# Patient Record
Sex: FEMALE | Race: BLACK OR AFRICAN AMERICAN | Employment: FULL TIME | URBAN - METROPOLITAN AREA
[De-identification: names, ages, dates, MRNs, and addresses within clinical notes are randomized per-mention and may not be internally consistent; named-entity substitution may affect disease eponyms.]

---

## 2019-03-04 ENCOUNTER — OFFICE VISIT (OUTPATIENT)
Dept: FAMILY MEDICINE CLINIC | Age: 44
End: 2019-03-04

## 2019-03-04 VITALS
TEMPERATURE: 97.5 F | BODY MASS INDEX: 42.36 KG/M2 | WEIGHT: 230.2 LBS | RESPIRATION RATE: 16 BRPM | HEART RATE: 68 BPM | DIASTOLIC BLOOD PRESSURE: 76 MMHG | SYSTOLIC BLOOD PRESSURE: 130 MMHG | OXYGEN SATURATION: 98 % | HEIGHT: 62 IN

## 2019-03-04 DIAGNOSIS — M67.432 GANGLION CYST OF WRIST, LEFT: ICD-10-CM

## 2019-03-04 DIAGNOSIS — F32.A DEPRESSION, UNSPECIFIED DEPRESSION TYPE: ICD-10-CM

## 2019-03-04 DIAGNOSIS — I10 ESSENTIAL HYPERTENSION: Primary | ICD-10-CM

## 2019-03-04 DIAGNOSIS — Z80.3 FAMILY HISTORY OF BREAST CANCER IN MOTHER: ICD-10-CM

## 2019-03-04 DIAGNOSIS — Z13.220 SCREENING, LIPID: ICD-10-CM

## 2019-03-04 DIAGNOSIS — D64.9 ANEMIA, UNSPECIFIED TYPE: ICD-10-CM

## 2019-03-04 DIAGNOSIS — N63.0 LARGE MASS OF BREAST: ICD-10-CM

## 2019-03-04 DIAGNOSIS — E55.9 VITAMIN D DEFICIENCY: ICD-10-CM

## 2019-03-04 DIAGNOSIS — Z13.29 SCREENING FOR THYROID DISORDER: ICD-10-CM

## 2019-03-04 PROBLEM — E66.01 OBESITY, MORBID (HCC): Status: ACTIVE | Noted: 2019-03-04

## 2019-03-04 RX ORDER — LISINOPRIL AND HYDROCHLOROTHIAZIDE 20; 25 MG/1; MG/1
1 TABLET ORAL DAILY
Qty: 90 TAB | Refills: 0 | Status: SHIPPED | OUTPATIENT
Start: 2019-03-04 | End: 2019-06-14 | Stop reason: SDUPTHER

## 2019-03-04 NOTE — PROGRESS NOTES
ESTABLISH CARE VISIT    SUBJECTIVE:     Chief Complaint   Patient presents with    Establish [facility]    Hypertension       HPI: 41 y.o. female with PMHx significant for hypertension, hidradenitis is here for the above chief complaint(s). Hypertension  Patient takes Lisinopril/HCTZ 20-25mg. She does states that she takes it at night. She takes it a night because she is worried about peeing a lot during the day, but she does get up to urinate twice at night. Large Breast  Patient has history of large breasts. She states that she wears a size \"G\" in bras. She has frequent back pain, her shoulders ache from bra straps, obvious indentions in her shoulders from bra. Her mother also had breast Ca at 46. Last mammogram 2018    Ganglion Cyst  Patient states that she has cyst on her left hand/wrist for the last wrist. Patient states that she has pain when she moves, tender to touch, hits it on things, getting larger in size. Patient had U/S of the wrist, cyst was conssitent with ganglion. She would like referral for removal.      Depression  Patient states that she has \"fits. \" She states that her coworkers have told her that she gets depressed. She does feel low at times, but doesn't know if this depression or just feeling low. Patient does have some loneliness at times. Patient denies SI, HI, AH, VH. Anemia  Patient has history of iron deficiency anemia, 2 to heavy periods. Will monitor. Health Maintenance:    Eye -  no complaints, last eye exam: 1/2019  Oral Health -  no complaints, last exam: needs to make apt. U/A -  no UTI sxs. Cardiac- denies history, denies chest pain  Mammo- 1/2019, Dr. Malcolm Michelle refers her for this  GYN- Dr. Malcolm Michelle- 1/2019  DEXA    Colonoscopy - no colon cancer in the family, screen at 48     Review of Systems   Constitutional: Negative for chills, diaphoresis, fever, malaise/fatigue and weight loss.    HENT: Negative for congestion, ear pain, hearing loss, nosebleeds, sinus pain, sore throat and tinnitus. Eyes: Negative for blurred vision, double vision, pain and discharge. Respiratory: Negative for cough, hemoptysis, shortness of breath and wheezing. Cardiovascular: Negative for chest pain, palpitations, orthopnea, claudication, leg swelling and PND. Gastrointestinal: Negative for abdominal pain, blood in stool, constipation, diarrhea, heartburn, melena, nausea and vomiting. Genitourinary: Negative for dysuria, flank pain, frequency, hematuria and urgency. Musculoskeletal: Negative for back pain, joint pain, myalgias and neck pain. Skin: Negative for itching and rash. Neurological: Negative for dizziness, speech change, focal weakness, seizures, weakness and headaches. Endo/Heme/Allergies: Negative for polydipsia. Does not bruise/bleed easily. Psychiatric/Behavioral: Positive for depression. Negative for memory loss, substance abuse and suicidal ideas. The patient is not nervous/anxious and does not have insomnia. @[facility]@  Current Outpatient Medications   Medication Sig    lisinopril (PRINIVIL, ZESTRIL) 20 mg tablet Take 20 mg by mouth daily. Indications: HYPERTENSION    methocarbamol (ROBAXIN) 750 mg tablet Take 1 Tab by mouth four (4) times daily. No current facility-administered medications for this visit.       Health Maintenance   Topic Date Due    DTaP/Tdap/Td series (1 - Tdap) 06/13/1998    PAP AKA CERVICAL CYTOLOGY  06/13/1998    Influenza Age 5 to Adult  08/01/2018       Medications and Allergies: Reviewed and confirmed in the chart    Past Medical Hx: Reviewed and confirmed in the chart  Past Medical History:   Diagnosis Date    Anemia     Gallbladder disease     GERD (gastroesophageal reflux disease)     Hypertension        Patient Active Problem List   Diagnosis Code    Ob[facility], morbid ([facility] E66.01       Family Hx, Surgical Hx, Social Hx: Reviewed and updated in EMR    OBJECTIVE:  Vitals:    03/04/19 0844   BP: 130/76   Pulse: 68   Resp: 16   Temp: 97.5 °F (36.4 °C)   TempSrc: Oral   SpO2: 98%   Weight: 230 lb 3.2 oz (104.4 kg)   Height: 5' 2\" (1.575 m)       BP Readings from Last 3 Encounters:   03/04/19 130/76   07/19/18 137/74   07/27/16 131/73     Wt Readings from Last 3 Encounters:   03/04/19 230 lb 3.2 oz (104.4 kg)   07/27/16 201 lb 4.5 oz (91.3 kg)       Physical Exam   Constitutional: She is oriented to person, place, and time and well-developed, well-nourished, and in no distress. No distress. Neck: Normal range of motion. Neck supple. No thyromegaly present. Cardiovascular: Normal rate, regular rhythm, normal heart sounds and intact distal pulses. Exam reveals no gallop and no friction rub. No murmur heard. Pulmonary/Chest: Effort normal and breath sounds normal. No respiratory distress. She has no wheezes. She has no rales. She exhibits no tenderness. Lymphadenopathy:     She has no cervical adenopathy. Neurological: She is alert and oriented to person, place, and time. Skin: Skin is warm and dry. She is not diaphoretic. Psychiatric: Mood, memory, affect and judgment normal.   Vitals reviewed. Lab Results   Component Value Date/Time    WBC 8.6 10/02/2017 09:40 AM    HGB 11.5 (L) 10/02/2017 09:40 AM    HCT 36.9 (L) 10/02/2017 09:40 AM    PLATELET 076 98/95/9463 09:40 AM     Lab Results   Component Value Date/Time    Sodium 139 08/31/2017 08:45 AM    Potassium 3.7 08/31/2017 08:45 AM    Chloride 103 08/31/2017 08:45 AM    CO2 29 08/31/2017 08:45 AM    Glucose 92 08/31/2017 08:45 AM    BUN 18 08/31/2017 08:45 AM    Creatinine 1.0 08/31/2017 08:45 AM     Lab Results   Component Value Date/Time    Cholesterol, total 173 08/31/2017 08:45 AM    HDL Cholesterol 44 08/31/2017 08:45 AM    LDL, calculated 111 08/31/2017 08:45 AM    Triglyceride 88 08/31/2017 08:45 AM     No results found for: GPT    Nursing Notes Reviewed    ASSESSMENT AND PLAN  Diagnoses and all orders for this visit:    1.  Essential hypertension  Advised patient to continue with BP medications as prescribed. Advised her to take medication in the morning. Patient is going to switch to see if this helps with night time urination.   -     lisinopril-hydroCHLOROthiazide (PRINZIDE, ZESTORETIC) 20-25 mg per tablet; Take 1 Tab by mouth daily.  -     LIPID PANEL; Future  -     METABOLIC PANEL, COMPREHENSIVE; Future  Monitor blood pressure at home. Report to clinic any systolic blood pressure >247 and/or diastolic blood pressure >887.     2. Large mass of breast  -     REFERRAL TO BREAST SURGERY    3. Family history of breast cancer in mother  -     REFERRAL TO BREAST SURGERY    4. Ganglion cyst of wrist, left  -     REFERRAL TO GENERAL SURGERY    5. Depression, unspecified depression type  -     REFERRAL TO PSYCHIATRY    6. Anemia, unspecified type  -     CBC WITH AUTOMATED DIFF; Future  -     FERRITIN; Future  -     IRON PROFILE; Future  -     VITAMIN B12; Future  -     FOLATE; Future    7. Screening for thyroid disorder  -     TSH 3RD GENERATION; Future    8. Vitamin D deficiency  -     VITAMIN D, 1, 25 DIHYDROXY; Future    9. Screening, lipid  -     LIPID PANEL; Future      AVS printed and provided to patient    Assessment and plan above discussed with patient, patient voiced understanding and agreement with plan. More than 50% of this 30 minute visit was spent face to face counseling the patient about the etiology and treatment options for the above health conditions outlined in assessment and plan     Candice PATTERSON  [address] [place] [address], [address] [place] [address] [phone]  JVP -   [phone]

## 2019-03-04 NOTE — PROGRESS NOTES
Chief Complaint   Patient presents with    Establish Care    Hypertension     1. Have you been to the ER, urgent care clinic since your last visit? Hospitalized since your last visit? No    2. Have you seen or consulted any other health care providers outside of the [address] [place] [address] since your last visit? Include any pap smears or colon screening.  No

## 2019-03-05 DIAGNOSIS — Z13.29 SCREENING FOR THYROID DISORDER: ICD-10-CM

## 2019-03-05 DIAGNOSIS — I10 ESSENTIAL HYPERTENSION: ICD-10-CM

## 2019-03-05 DIAGNOSIS — Z13.220 SCREENING, LIPID: ICD-10-CM

## 2019-03-05 DIAGNOSIS — D64.9 ANEMIA, UNSPECIFIED TYPE: ICD-10-CM

## 2019-03-08 ENCOUNTER — LAB ONLY (OUTPATIENT)
Dept: FAMILY MEDICINE CLINIC | Age: 44
End: 2019-03-08

## 2019-03-08 DIAGNOSIS — Z01.89 ROUTINE LAB DRAW: Primary | ICD-10-CM

## 2019-03-11 ENCOUNTER — CLINICAL SUPPORT (OUTPATIENT)
Dept: FAMILY MEDICINE CLINIC | Age: 44
End: 2019-03-11

## 2019-03-11 DIAGNOSIS — I10 ESSENTIAL HYPERTENSION: Primary | ICD-10-CM

## 2019-03-11 DIAGNOSIS — D64.9 ANEMIA, UNSPECIFIED TYPE: ICD-10-CM

## 2019-03-11 NOTE — PROGRESS NOTES
Samantha Turcios is a 41 y.o. female here this morning for labs only. She tolerated well and left showing no s/s of distress.

## 2019-03-12 LAB
A-G RATIO,AGRAT: 1.3 RATIO (ref 1.1–2.6)
ABSOLUTE LYMPHOCYTE COUNT, 10803: 2.5 K/UL (ref 1–4.8)
ALBUMIN SERPL-MCNC: 3.7 G/DL (ref 3.5–5)
ALP SERPL-CCNC: 90 U/L (ref 25–115)
ALT SERPL-CCNC: 7 U/L (ref 5–40)
ANION GAP SERPL CALC-SCNC: 17 MMOL/L
AST SERPL W P-5'-P-CCNC: 7 U/L (ref 10–37)
BASOPHILS # BLD: 0 K/UL (ref 0–0.2)
BASOPHILS NFR BLD: 0 % (ref 0–2)
BILIRUB SERPL-MCNC: 0.3 MG/DL (ref 0.2–1.2)
BUN SERPL-MCNC: 12 MG/DL (ref 6–22)
CALCIUM SERPL-MCNC: 9.1 MG/DL (ref 8.4–10.5)
CHLORIDE SERPL-SCNC: 103 MMOL/L (ref 98–110)
CHOLEST SERPL-MCNC: 156 MG/DL (ref 110–200)
CO2 SERPL-SCNC: 25 MMOL/L (ref 20–32)
CREAT SERPL-MCNC: 0.9 MG/DL (ref 0.5–1.2)
EOSINOPHIL # BLD: 0.1 K/UL (ref 0–0.5)
EOSINOPHIL NFR BLD: 1 % (ref 0–6)
ERYTHROCYTE [DISTWIDTH] IN BLOOD BY AUTOMATED COUNT: 18 % (ref 10–15.5)
FE % SATURATION,PSAT: 19 % (ref 20–50)
FERRITIN SERPL-MCNC: 39 NG/ML (ref 10–291)
FOLATE,FOL: 7.96 NG/ML
GFRAA, 66117: >60
GFRNA, 66118: >60
GLOBULIN,GLOB: 2.9 G/DL (ref 2–4)
GLUCOSE SERPL-MCNC: 104 MG/DL (ref 70–99)
GRANULOCYTES,GRANS: 63 % (ref 40–75)
HCT VFR BLD AUTO: 38.4 % (ref 35.1–46.5)
HDLC SERPL-MCNC: 3.8 MG/DL (ref 0–5)
HDLC SERPL-MCNC: 41 MG/DL (ref 40–59)
HGB BLD-MCNC: 11.9 G/DL (ref 11.7–15.5)
IRON,IRN: 50 MCG/DL (ref 30–160)
LDLC SERPL CALC-MCNC: 96 MG/DL (ref 50–99)
LYMPHOCYTES, LYMLT: 33 % (ref 20–45)
MCH RBC QN AUTO: 24 PG (ref 26–34)
MCHC RBC AUTO-ENTMCNC: 31 G/DL (ref 31–36)
MCV RBC AUTO: 79 FL (ref 80–95)
MONOCYTES # BLD: 0.3 K/UL (ref 0.1–1)
MONOCYTES NFR BLD: 4 % (ref 3–12)
NEUTROPHILS # BLD AUTO: 4.9 K/UL (ref 1.8–7.7)
PLATELET # BLD AUTO: 312 K/UL (ref 140–440)
PMV BLD AUTO: 10.6 FL (ref 9–13)
POTASSIUM SERPL-SCNC: 4.1 MMOL/L (ref 3.5–5.5)
PROT SERPL-MCNC: 6.6 G/DL (ref 6.4–8.3)
RBC # BLD AUTO: 4.88 M/UL (ref 3.8–5.2)
SODIUM SERPL-SCNC: 145 MMOL/L (ref 133–145)
TIBC,TIBC: 268 MCG/DL (ref 228–428)
TRIGL SERPL-MCNC: 98 MG/DL (ref 40–149)
TSH SERPL DL<=0.005 MIU/L-ACNC: 1.71 MCU/ML (ref 0.27–4.2)
UIBC SERPL-MCNC: 218 MCG/DL (ref 110–370)
VIT B12 SERPL-MCNC: 499 PG/ML (ref 211–911)
VLDLC SERPL CALC-MCNC: 20 MG/DL (ref 8–30)
WBC # BLD AUTO: 7.8 K/UL (ref 4–11)

## 2019-03-13 LAB — CALCITRIOL, 081092: 39 PG/ML (ref 19.9–79.3)

## 2019-03-15 ENCOUNTER — TELEPHONE (OUTPATIENT)
Dept: FAMILY MEDICINE CLINIC | Age: 44
End: 2019-03-15

## 2019-03-15 NOTE — TELEPHONE ENCOUNTER
Patient informed of lab results as detailed by provider. Patient verbalized understanding of information/instructions given.

## 2019-03-15 NOTE — TELEPHONE ENCOUNTER
Please let patient know that her labs look great! She should boost her iron-rich foods, but she is showing no signs of anemia at this time. Candice German PA-C  [facility]  [phone]  [address]

## 2019-03-27 ENCOUNTER — TELEPHONE (OUTPATIENT)
Dept: SURGERY | Age: 44
End: 2019-03-27

## 2019-03-29 ENCOUNTER — OFFICE VISIT (OUTPATIENT)
Dept: SURGERY | Age: 44
End: 2019-03-29

## 2019-03-29 ENCOUNTER — TELEPHONE (OUTPATIENT)
Dept: FAMILY MEDICINE CLINIC | Age: 44
End: 2019-03-29

## 2019-03-29 VITALS
OXYGEN SATURATION: 93 % | HEART RATE: 71 BPM | WEIGHT: 230 LBS | HEIGHT: 62 IN | TEMPERATURE: 97 F | RESPIRATION RATE: 18 BRPM | BODY MASS INDEX: 42.33 KG/M2 | SYSTOLIC BLOOD PRESSURE: 128 MMHG | DIASTOLIC BLOOD PRESSURE: 71 MMHG

## 2019-03-29 DIAGNOSIS — N62 GIGANTOMASTIA: Primary | ICD-10-CM

## 2019-03-29 NOTE — TELEPHONE ENCOUNTER
Can we please send this patient to a plastic surgeon that does breast surgery? Thank you. Candice German PA-C  [facility]  [phone]  [address]

## 2019-03-29 NOTE — TELEPHONE ENCOUNTER
Pt called & said she had just left her appt w/ Breast Surgeon and that the referral to her was wrong.  She only sees breast cancer patients and that she needs to see a cosmetic breast dr. [address] requested a new referral.

## 2019-03-29 NOTE — PROGRESS NOTES
Ms. Luther Black is a 41year old woman with a history of large, painful breasts. There is a family history of breast cancer. Ms. Jerrell Marx thought she was being referred for reduction surgery. I do not perform reduction surgery and have recommended a plastic surgeon. I explained there was mention of a breast mass within her records. She denied mass and did not want to discuss her family history or consider genetic testing or high risk screening at this time. Breast/GYN history:    OB History    None          Past Medical History:   Diagnosis Date    Anemia     Gallbladder disease     GERD (gastroesophageal reflux disease)     Hidradenitis     Hypertension        Past Surgical History:   Procedure Laterality Date    HX CHOLECYSTECTOMY      HX CYST REMOVAL      HX OTHER SURGICAL      EXC OF AXILLARY HIDRADENITIS    HX TUBAL LIGATION  2014       Current Outpatient Medications on File Prior to Visit   Medication Sig Dispense Refill    lisinopril-hydroCHLOROthiazide (PRINZIDE, ZESTORETIC) 20-25 mg per tablet Take 1 Tab by mouth daily. 90 Tab 0    methocarbamol (ROBAXIN) 750 mg tablet Take 1 Tab by mouth four (4) times daily. 12 Tab 0     No current facility-administered medications on file prior to visit.         No Known Allergies    Social History     Tobacco Use    Smoking status: Never Smoker    Smokeless tobacco: Never Used   Substance Use Topics    Alcohol use: No    Drug use: No       Family History   Problem Relation Age of Onset    Hypertension Mother     Kidney Disease Mother     Breast Cancer Mother 46    Cancer Mother 47        breast         ROS: positives are bolded  General: fevers, chills, night sweats, fatigue, weight loss, weight gain  GI: nausea, vomiting, abdominal pain, change in bowel habits, hematochezia, melena, GERD  Integ: dermatitis, abnormal moles  HEENT: visual changes, vertigo, epistaxis, dysphagia, hoarseness  Cardiac: chest pain, palpitations, HTN, edema, varicosities  Resp: cough, shortness of breath, wheezing, hemoptysis, snoring, reactive airway disease  : hematuria, dysuria, frequency, urgency, nocturia, stress urinary incontinence   MSK: weakness, joint pain, arthritis  Endocrine:  thyroid disease, polyuria, polydipsia, polyphagia, poor wound healing, heat intolerance, cold intolerance  Lymph/Heme: anemia, bruising, history of blood transfusions  Neuro: dizziness, headache, fainting, seizures, stroke  Psych: anxiety, depression    Physical Exam:  Visit Vitals  /71 (BP 1 Location: Right arm, BP Patient Position: Sitting)   Pulse 71   Temp 97 °F (36.1 °C) (Oral)   Resp 18   Ht 5' 2\" (1.575 m)   Wt 104.3 kg (230 lb)   LMP 2019 (Exact Date)   SpO2 93%   BMI 42.07 kg/m²       Gen:  No distress  Head: normocephalic, atraumatic  Mouth: Clear, no overt lesions, oral mucosa pink and moist  Extremeties: warm, well-perfused  Psych: alert and oriented to person, place and time  Breasts:   Patient declined to get undressed      Imagin19 bilateral mammogram ([facility])  BIRADS 1    Impression:  Patient Active Problem List   Diagnosis Code    Obesity, morbid (HCC) E66.01    Hypertension I10    GERD (gastroesophageal reflux disease) K21.9    Anemia D64.9    Gigantomastia N56      40 year old woman with large breasts, interested in reduction surgery. I do not perform reduction surgery and have recommended a plastic surgeon. I explained there was mention of a breast mass within her records. She denied mass and did not want to discuss her family history or consider genetic testing or high risk screening at this time. She declined to discuss conservative measures for breast pain. I have not scheduled any additional follow up. All questions were answered. She was asked to call with any additional questions or concerns.

## 2019-03-29 NOTE — PATIENT INSTRUCTIONS
If you have any questions or concerns about today's appointment, the verbal and/or written instructions you were given for follow up care, please call our office at [phone].     [facility] - [address]    [phone] office  [phone]

## 2019-04-01 ENCOUNTER — OFFICE VISIT (OUTPATIENT)
Dept: FAMILY MEDICINE CLINIC | Age: 44
End: 2019-04-01

## 2019-04-01 VITALS
TEMPERATURE: 98.1 F | WEIGHT: 233 LBS | HEIGHT: 62 IN | DIASTOLIC BLOOD PRESSURE: 74 MMHG | HEART RATE: 85 BPM | BODY MASS INDEX: 42.88 KG/M2 | RESPIRATION RATE: 16 BRPM | OXYGEN SATURATION: 95 % | SYSTOLIC BLOOD PRESSURE: 129 MMHG

## 2019-04-01 DIAGNOSIS — N62 GIGANTOMASTIA: Primary | ICD-10-CM

## 2019-04-01 DIAGNOSIS — J30.9 ALLERGIC RHINITIS, UNSPECIFIED SEASONALITY, UNSPECIFIED TRIGGER: ICD-10-CM

## 2019-04-01 DIAGNOSIS — J02.9 PHARYNGITIS, UNSPECIFIED ETIOLOGY: Primary | ICD-10-CM

## 2019-04-01 LAB
S PYO AG THROAT QL: NEGATIVE
VALID INTERNAL CONTROL?: YES

## 2019-04-01 RX ORDER — FLUTICASONE PROPIONATE 50 MCG
2 SPRAY, SUSPENSION (ML) NASAL DAILY
Qty: 1 BOTTLE | Refills: 0 | Status: SHIPPED | OUTPATIENT
Start: 2019-04-01 | End: 2019-09-18

## 2019-04-01 RX ORDER — CETIRIZINE HCL 10 MG
10 TABLET ORAL DAILY
Qty: 30 TAB | Refills: 0 | Status: SHIPPED | OUTPATIENT
Start: 2019-04-01 | End: 2019-04-06

## 2019-04-01 NOTE — PROGRESS NOTES
Chief Complaint   Patient presents with    Referral Follow Up     Patient states that she was referred to a breast surgeon and that was the [address] [place] [address] breast doctor. 1. Have you been to the ER, urgent care clinic since your last visit? Hospitalized since your last visit? No    2. Have you seen or consulted any other health care providers outside of the [address] [place] [address] since your last visit? Include any pap smears or colon screening.  Dr Kaitlin Ravi

## 2019-04-02 ENCOUNTER — TELEPHONE (OUTPATIENT)
Dept: SURGERY | Age: 44
End: 2019-04-02

## 2019-04-02 NOTE — TELEPHONE ENCOUNTER
Spoke with Ms. Juliano Hernandez regarding referral to Dr. Sandra Ta to inquire if she has a preference regarding day/time to schedule appointment. Ms. Juliano Hernandez indicated no preference opt to accept first available appointment Dr. Sandra Ta office has.

## 2019-04-02 NOTE — TELEPHONE ENCOUNTER
Per provider, send referral and notes to Dr Flora Holbrook with [facility]. Fax # is [phone]. Routed to referral coordinator.

## 2019-04-02 NOTE — TELEPHONE ENCOUNTER
Left voice mail for Ms. Meade to inform appointment with Dr. Melba Recinos is scheduled for Tuesday, April 9, 2019 at 9:15am at [address]   [phone])[phone] and per Odalys Claudio at Dr. Melba Recinos office I asked Ms. Meade to obtain medical records from any other provider that she's seen for gigantomastia and request the providers to forward records to Dr. Melba Recinos office.

## 2019-04-03 NOTE — TELEPHONE ENCOUNTER
General 04/01/2019 12:35 PM Lucy Barnes - -   Note    [facility]  MD Hao Leonardo MD  [address]  Phone: [phone]  Fax: [phone]

## 2019-04-07 NOTE — PROGRESS NOTES
Follow Up Visit Note    Chief Complaint   Patient presents with    Referral Follow Up     Patient states that she was referred to a breast surgeon and that was the [address] [place] [address] breast doctor.  Cold Symptoms     c/o itchy scratchy throat, watery eyes, sneezing, nasal congestion       HPI:  Brianna Cartagena is a 41 y.o.  female  has a past medical history of Anemia, Gallbladder disease, GERD (gastroesophageal reflux disease), Hidradenitis, and Hypertension. is here for the above complaint(s). Cold Symptoms  Patient states that she has had sore/scratchy throat, runny nose, sinus congestion, watery eyes and mild cough x 24-48 hours. Patient has tried cough drops, with some relief. Patient states that she is also sneezing some. She denies any fevers/chills, abdominal pain, n/v/d. Review of Systems   Constitutional: Negative for chills, fever, malaise/fatigue and weight loss. HENT: Positive for congestion and sore throat. Negative for ear discharge, ear pain, hearing loss, nosebleeds, sinus pain and tinnitus. Eyes: Positive for discharge (clear discharge). Negative for blurred vision, double vision, photophobia, pain and redness. Respiratory: Negative for cough, sputum production, shortness of breath, wheezing and stridor. Cardiovascular: Negative for chest pain, palpitations, orthopnea, claudication and leg swelling. Gastrointestinal: Negative for abdominal pain, constipation, diarrhea, nausea and vomiting. Genitourinary: Negative for dysuria, frequency and urgency. Neurological: Negative for dizziness, tingling and headaches. Current Outpatient Medications   Medication Sig    lisinopril-hydroCHLOROthiazide (PRINZIDE, ZESTORETIC) 20-25 mg per tablet Take 1 Tab by mouth daily.  methocarbamol (ROBAXIN) 750 mg tablet Take 1 Tab by mouth four (4) times daily. No current facility-administered medications for this visit.       Health Maintenance   Topic Date Due    DTaP/Tdap/Td series (1 - Tdap) 06/13/1998    PAP AKA CERVICAL CYTOLOGY  06/13/1998    Influenza Age 5 to Adult  08/01/2019    Pneumococcal 0-64 years  Aged Out       There is no immunization history on file for this patient. Allergies and Medications: Reviewed and updated in EMR. Past Medical History:   Diagnosis Date    Anemia     Gallbladder disease     GERD (gastroesophageal reflux disease)     Hidradenitis     Hypertension        Surgical History: Reviewed and updated in EMR as appropriate. Social History: Reviewed and updated in EMR as appropriate. Family History: Reviewed and updated in EMR as appropriate. OBJECTIVE:   Visit Vitals  /74   Pulse 85   Temp 98.1 °F (36.7 °C) (Oral)   Resp 16   Ht 5' 2\" (1.575 m)   Wt 233 lb (105.7 kg)   LMP 03/26/2019 (Exact Date)   SpO2 95%   BMI 42.62 kg/m²        Physical Exam   Constitutional: She is oriented to person, place, and time and well-developed, well-nourished, and in no distress. No distress. HENT:   Right Ear: Tympanic membrane is injected. Tympanic membrane is not scarred, not perforated, not erythematous, not retracted and not bulging. A middle ear effusion (clear effusion) is present. No hemotympanum. Left Ear: Tympanic membrane is not injected, not scarred, not perforated, not erythematous, not retracted and not bulging. No middle ear effusion. No hemotympanum. Nose: Mucosal edema and rhinorrhea present. No nose lacerations, sinus tenderness, nasal deformity, septal deviation or nasal septal hematoma. Mouth/Throat: Uvula is midline and oropharynx is clear and moist.   Neck: Normal range of motion. Neck supple. No thyromegaly present. Cardiovascular: Normal rate, regular rhythm, normal heart sounds and intact distal pulses. Exam reveals no gallop and no friction rub. No murmur heard. Pulmonary/Chest: Effort normal and breath sounds normal. No respiratory distress. She has no wheezes. She has no rales.  She exhibits no tenderness. Lymphadenopathy:     She has no cervical adenopathy. Neurological: She is alert and oriented to person, place, and time. Skin: Skin is warm and dry. She is not diaphoretic. Nursing note and vitals reviewed. LABS/RADIOLOGICAL TESTS:  Lab Results   Component Value Date/Time    WBC 7.8 03/11/2019 08:20 AM    HGB 11.9 03/11/2019 08:20 AM    HCT 38.4 03/11/2019 08:20 AM    PLATELET 600 03/21/1534 08:20 AM     Lab Results   Component Value Date/Time    Sodium 145 03/11/2019 08:20 AM    Potassium 4.1 03/11/2019 08:20 AM    Chloride 103 03/11/2019 08:20 AM    CO2 25 03/11/2019 08:20 AM    Glucose 104 (H) 03/11/2019 08:20 AM    BUN 12 03/11/2019 08:20 AM    Creatinine 0.9 03/11/2019 08:20 AM     Lab Results   Component Value Date/Time    Cholesterol, total 156 03/11/2019 08:20 AM    HDL Cholesterol 41 03/11/2019 08:20 AM    LDL, calculated 96 03/11/2019 08:20 AM    Triglyceride 98 03/11/2019 08:20 AM     No results found for: GPT  No results found for: HBA1C, HGBE8, ZNJ1OPXA, RYI4EARF      All lab results and radiological studies were reviewed and discussed with the patient. ASSESSMENT/PLAN:      ICD-10-CM ICD-9-CM    1. Pharyngitis, unspecified etiology J02.9 462 AMB POC RAPID STREP A      fluticasone propionate (FLONASE) 50 mcg/actuation nasal spray      DISCONTINUED: cetirizine (ZYRTEC) 10 mg tablet   2. Viral URI J06.9 465.9    3. Allergic rhinitis, unspecified seasonality, unspecified trigger J30.9 477.9 fluticasone propionate (FLONASE) 50 mcg/actuation nasal spray      DISCONTINUED: cetirizine (ZYRTEC) 10 mg tablet     Patient verbalized understanding and agreement with the plan. Patient was given an after-visit summary. Follow-up in 1 week or sooner if worsening symptoms. More than 50% of this 15 minute visit was spent counseling the patient face to face about upper respiratory tract infection. Candice Burr  [address] [place] [address] Follow Up Visit Note    Chief Complaint   Patient presents with    Referral Follow Up     Patient states that she was referred to a breast surgeon and that was the [address] [place] [address] breast doctor.  Cold Symptoms     c/o itchy scratchy throat, watery eyes, sneezing, nasal congestion       HPI:  Brianna Cartagena is a 41 y.o.  female  has a past medical history of Anemia, Gallbladder disease, GERD (gastroesophageal reflux disease), Hidradenitis, and Hypertension. is here for the above complaint(s). Cold Symptoms  Patient states that she has had sore/scratchy throat, runny nose, sinus congestion, watery eyes and mild cough x 24-48 hours. Patient has tried cough drops, with some relief. Patient states that she is also sneezing some. She denies any fevers/chills, abdominal pain, n/v/d. Review of Systems   Constitutional: Negative for chills, fever, malaise/fatigue and weight loss. HENT: Positive for congestion and sore throat. Negative for ear discharge, ear pain, hearing loss, nosebleeds, sinus pain and tinnitus. Eyes: Positive for discharge (clear discharge). Negative for blurred vision, double vision, photophobia, pain and redness. Respiratory: Negative for cough, sputum production, shortness of breath, wheezing and stridor. Cardiovascular: Negative for chest pain, palpitations, orthopnea, claudication and leg swelling. Gastrointestinal: Negative for abdominal pain, constipation, diarrhea, nausea and vomiting. Genitourinary: Negative for dysuria, frequency and urgency. Neurological: Negative for dizziness, tingling and headaches. Current Outpatient Medications   Medication Sig    lisinopril-hydroCHLOROthiazide (PRINZIDE, ZESTORETIC) 20-25 mg per tablet Take 1 Tab by mouth daily.  methocarbamol (ROBAXIN) 750 mg tablet Take 1 Tab by mouth four (4) times daily. No current facility-administered medications for this visit.       Health Maintenance   Topic Date Due    tenderness. Lymphadenopathy:     She has no cervical adenopathy. Neurological: She is alert and oriented to person, place, and time. Skin: Skin is warm and dry. She is not diaphoretic. Nursing note and vitals reviewed. LABS/RADIOLOGICAL TESTS:  Lab Results   Component Value Date/Time    WBC 7.8 03/11/2019 08:20 AM    HGB 11.9 03/11/2019 08:20 AM    HCT 38.4 03/11/2019 08:20 AM    PLATELET 712 03/96/3953 08:20 AM     Lab Results   Component Value Date/Time    Sodium 145 03/11/2019 08:20 AM    Potassium 4.1 03/11/2019 08:20 AM    Chloride 103 03/11/2019 08:20 AM    CO2 25 03/11/2019 08:20 AM    Glucose 104 (H) 03/11/2019 08:20 AM    BUN 12 03/11/2019 08:20 AM    Creatinine 0.9 03/11/2019 08:20 AM     Lab Results   Component Value Date/Time    Cholesterol, total 156 03/11/2019 08:20 AM    HDL Cholesterol 41 03/11/2019 08:20 AM    LDL, calculated 96 03/11/2019 08:20 AM    Triglyceride 98 03/11/2019 08:20 AM     No results found for: GPT  No results found for: HBA1C, HGBE8, NPK2ZWSS, [id number]      All lab results and radiological studies were reviewed and discussed with the patient. ASSESSMENT/PLAN:      ICD-10-CM ICD-9-CM    1. Pharyngitis, unspecified etiology J02.9 462 AMB POC RAPID STREP A      fluticasone propionate (FLONASE) 50 mcg/actuation nasal spray      cetirizine (ZYRTEC) 10 mg tablet          3. Allergic rhinitis, unspecified seasonality, unspecified trigger J30.9 477.9 fluticasone propionate (FLONASE) 50 mcg/actuation nasal spray       cetirizine (ZYRTEC) 10 mg tablet  Explained to patient that this looks and seems like an allergy etiology, but could certainly be viral URI. Will try zyrtec and flonase first. If symptoms continue or persist, follow-up in clinic. Patient verbalizes understanding. Patient verbalized understanding and agreement with the plan. Patient was given an after-visit summary. Follow-up in 1 week or sooner if worsening symptoms.     More than 50% of this 15 minute visit was spent counseling the patient face to face about upper respiratory tract infection. Candice German PA-C  [address], [address] [place] [address], [address] [place] [address] [address] [place] [address] [phone]

## 2019-04-12 ENCOUNTER — TELEPHONE (OUTPATIENT)
Dept: FAMILY MEDICINE CLINIC | Age: 44
End: 2019-04-12

## 2019-04-12 NOTE — TELEPHONE ENCOUNTER
Pt called to follow up on referral to [facility] regarding Cyst on wrist. Pt states has not heard anything as of yet. Gave pt all info to general surgery and advised pt to call and make an apt pt states ok.

## 2019-04-24 NOTE — TELEPHONE ENCOUNTER
Pt states has called [facility] hand surgery mutliple times and has left several messages.   Pt states her not sure if there is another facility that she can go to

## 2019-04-24 NOTE — TELEPHONE ENCOUNTER
Spoke to [employer] and was told the pt called back and requested referral be refaxed over to [facility]; pt has scheduled appt for early next month. Referral faxed and confirmation received.

## 2019-04-29 ENCOUNTER — OFFICE VISIT (OUTPATIENT)
Dept: FAMILY MEDICINE CLINIC | Age: 44
End: 2019-04-29

## 2019-04-29 VITALS
HEIGHT: 62 IN | SYSTOLIC BLOOD PRESSURE: 123 MMHG | WEIGHT: 232.8 LBS | RESPIRATION RATE: 16 BRPM | DIASTOLIC BLOOD PRESSURE: 67 MMHG | OXYGEN SATURATION: 95 % | BODY MASS INDEX: 42.84 KG/M2 | TEMPERATURE: 97.8 F | HEART RATE: 80 BPM

## 2019-04-29 DIAGNOSIS — G47.39 SLEEP APNEA-LIKE BEHAVIOR: ICD-10-CM

## 2019-04-29 DIAGNOSIS — R05.9 COUGH: Primary | ICD-10-CM

## 2019-04-29 DIAGNOSIS — R53.82 CHRONIC FATIGUE: ICD-10-CM

## 2019-04-29 DIAGNOSIS — I51.7 MILD CARDIOMEGALY: ICD-10-CM

## 2019-04-29 DIAGNOSIS — J30.9 ALLERGIC RHINITIS, UNSPECIFIED SEASONALITY, UNSPECIFIED TRIGGER: ICD-10-CM

## 2019-04-29 DIAGNOSIS — R05.9 COUGH: ICD-10-CM

## 2019-04-29 DIAGNOSIS — R06.83 SNORING: ICD-10-CM

## 2019-04-29 RX ORDER — BENZONATATE 100 MG/1
100 CAPSULE ORAL
COMMUNITY
End: 2021-01-20

## 2019-04-29 RX ORDER — ALBUTEROL SULFATE 90 UG/1
2 AEROSOL, METERED RESPIRATORY (INHALATION)
Qty: 1 INHALER | Refills: 0 | Status: SHIPPED | OUTPATIENT
Start: 2019-04-29 | End: 2021-01-20

## 2019-04-29 RX ORDER — MONTELUKAST SODIUM 10 MG/1
10 TABLET ORAL DAILY
Qty: 90 TAB | Refills: 0 | Status: SHIPPED | OUTPATIENT
Start: 2019-04-29 | End: 2021-01-20

## 2019-04-29 NOTE — PROGRESS NOTES
Chief Complaint   Patient presents with    Cough     Coughing for 3 weeks     1. Have you been to the ER, urgent care clinic since your last visit? Hospitalized since your last visit? No    2. Have you seen or consulted any other health care providers outside of the [address] [place] [address] since your last visit? Include any pap smears or colon screening.  Dr Damon Butler,

## 2019-04-29 NOTE — PROGRESS NOTES
Patient: Fabi Amin  Date of Service: 4/29/2019   Provider: Janes Mancera PA-C     REASON FOR VISIT:   Chief Complaint   Patient presents with    Cough     Coughing for 3 weeks        HISTORY OF PRESENT ILLNESS:   Fabi Amin is a 41 y.o. female who presents to the office for acute care. Fatigue/Snoring  Patient states that she is always fatigued. Patient states that daughter has told her that she snores loudly. And she has noticed that she stops breathing as well in the middle of the night. Patient endorses dry mouth. Cough  Patient states that she has had cough for since she saw me on 4/1/2019. She went to the ED and was given breathing treatments and tessalon pearls. Patient states that this has not improved in the last 3 weeks. Patient states that her cough is productive. She also endorses some shortness of breath with activity. Patient denies runny nose, sinus congestion, headache, post nasal drip. She has not been taking the flonase/claritin as prescribed. Patient denies fevers/chills, abdominal pain, n/v/d. Cardiomegaly  Patient was noted to have cardiomegaly on CXR when she went to the ED a few weeks ago. This is a new finding. She does have a history of hypertension, bp is controlled today on   Key CAD CHF Meds             lisinopril-hydroCHLOROthiazide (PRINZIDE, ZESTORETIC) 20-25 mg per tablet (Taking) Take 1 Tab by mouth daily. ALLERGIES:   No Known Allergies     ACTIVE MEDICAL PROBLEMS:  Patient Active Problem List   Diagnosis Code    Obesity, morbid ([facility]) E66.01    Hypertension I10    GERD (gastroesophageal reflux disease) K21.9    Anemia D64.9    Gigantomastia N62        MEDICATIONS:   Current Outpatient Medications   Medication Sig Dispense Refill    benzonatate (TESSALON PERLES) 100 mg capsule Take 100 mg by mouth three (3) times daily as needed for Cough.       lisinopril-hydroCHLOROthiazide (PRINZIDE, ZESTORETIC) 20-25 mg per tablet Take 1 Tab by mouth daily. 90 Tab 0    fluticasone propionate (FLONASE) 50 mcg/actuation nasal spray 2 Sprays by Both Nostrils route daily. 1 Bottle 0      Review of Systems   Constitutional: Positive for malaise/fatigue. Negative for chills, fever and weight loss. HENT: Negative for congestion, ear pain, sinus pain, sore throat and tinnitus. Eyes: Negative for blurred vision, double vision and pain. Respiratory: Positive for cough. Negative for sputum production, shortness of breath, wheezing and stridor. Cardiovascular: Negative for chest pain, palpitations, orthopnea, claudication and leg swelling. Gastrointestinal: Negative for abdominal pain, constipation, diarrhea, nausea and vomiting. Genitourinary: Negative for dysuria, frequency and urgency. Musculoskeletal: Positive for back pain and neck pain. Negative for falls, joint pain and myalgias. Neurological: Negative for dizziness, tingling and headaches. PHYSICAL EXAMINATION:  Visit Vitals  /67   Pulse 80   Temp 97.8 °F (36.6 °C) (Oral)   Resp 16   Ht 5' 2\" (1.575 m)   Wt 232 lb 12.8 oz (105.6 kg)   LMP 04/25/2019 (Approximate)   SpO2 95%   BMI 42.58 kg/m²      Body mass index is 42.58 kg/m². Physical Exam   Constitutional: She is oriented to person, place, and time and well-developed, well-nourished, and in no distress. No distress. HENT:   Head: Normocephalic and atraumatic. Right Ear: Tympanic membrane is injected. Tympanic membrane is not scarred, not perforated, not erythematous and not retracted. A middle ear effusion (+clear effusio) is present. Left Ear: Tympanic membrane is not injected, not scarred, not perforated, not erythematous and not retracted. No middle ear effusion. Nose: Mucosal edema and rhinorrhea present. No sinus tenderness, nasal deformity or septal deviation. Mouth/Throat: Uvula is midline and mucous membranes are normal. Posterior oropharyngeal erythema present. No posterior oropharyngeal edema. Neck: Normal range of motion. Neck supple. No thyromegaly present. Cardiovascular: Normal rate, regular rhythm, normal heart sounds and intact distal pulses. Exam reveals no gallop and no friction rub. No murmur heard. Pulmonary/Chest: Effort normal. No respiratory distress. She has decreased breath sounds in the right lower field and the left lower field. She has no wheezes. She has no rales. She exhibits no tenderness. Lymphadenopathy:     She has no cervical adenopathy. Neurological: She is alert and oriented to person, place, and time. Skin: Skin is warm and dry. She is not diaphoretic. Psychiatric: Mood, memory, affect and judgment normal.   Vitals reviewed. ASSESSMENT/PLAN:    ICD-10-CM ICD-9-CM    1. Cough R05 786.2 XR CHEST PA LAT      CBC WITH AUTOMATED DIFF      montelukast (SINGULAIR) 10 mg tablet      NT-PRO BNP      albuterol (PROVENTIL HFA, VENTOLIN HFA, PROAIR HFA) 90 mcg/actuation inhaler   2. Allergic rhinitis, unspecified seasonality, unspecified trigger J30.9 477.9 montelukast (SINGULAIR) 10 mg tablet   3. Chronic fatigue R53.82 780.79    4. Mild cardiomegaly I51.7 429.3 CBC WITH AUTOMATED DIFF      NT-PRO BNP      ECHO ADULT COMPLETE      CANCELED: NT-PRO BNP      CANCELED: ECHO ADULT COMPLETE   5. Snoring R06.83 786.09 SLEEP MEDICINE REFERRAL   6. Sleep apnea-like behavior G47.39 780.59 SLEEP MEDICINE REFERRAL       Patient advised to return to clinic if symptoms persist or to ED if they become worse  Patient verbalized understanding to above instructions. A total of 25 minutes were spent face-to-face with the patient during this encounter and over half of that time was spent on counseling and coordination of care. We discussed in depth the importance of managing hypertension, cardiogealy, cough, allergies.          Benuel Sacks Schappert, PA-C  4/29/2019   1:04 PM

## 2019-04-30 DIAGNOSIS — I51.7 MILD CARDIOMEGALY: ICD-10-CM

## 2019-04-30 DIAGNOSIS — R05.9 COUGH: ICD-10-CM

## 2019-04-30 LAB
ABSOLUTE LYMPHOCYTE COUNT, 10803: 2.6 K/UL (ref 1–4.8)
BASOPHILS # BLD: 0 K/UL (ref 0–0.2)
BASOPHILS NFR BLD: 0 % (ref 0–2)
BNP SERPL-MCNC: 53 PG/ML
EOSINOPHIL # BLD: 0.1 K/UL (ref 0–0.5)
EOSINOPHIL NFR BLD: 1 % (ref 0–6)
ERYTHROCYTE [DISTWIDTH] IN BLOOD BY AUTOMATED COUNT: 16.3 % (ref 10–15.5)
GRANULOCYTES,GRANS: 67 % (ref 40–75)
HCT VFR BLD AUTO: 37.3 % (ref 35.1–46.5)
HGB BLD-MCNC: 11.7 G/DL (ref 11.7–15.5)
LYMPHOCYTES, LYMLT: 27 % (ref 20–45)
MCH RBC QN AUTO: 24 PG (ref 26–34)
MCHC RBC AUTO-ENTMCNC: 31 G/DL (ref 31–36)
MCV RBC AUTO: 78 FL (ref 80–95)
MONOCYTES # BLD: 0.4 K/UL (ref 0.1–1)
MONOCYTES NFR BLD: 4 % (ref 3–12)
NEUTROPHILS # BLD AUTO: 6.3 K/UL (ref 1.8–7.7)
PLATELET # BLD AUTO: 324 K/UL (ref 140–440)
PMV BLD AUTO: 10.7 FL (ref 9–13)
RBC # BLD AUTO: 4.79 M/UL (ref 3.8–5.2)
WBC # BLD AUTO: 9.4 K/UL (ref 4–11)

## 2019-05-03 ENCOUNTER — TELEPHONE (OUTPATIENT)
Dept: FAMILY MEDICINE CLINIC | Age: 44
End: 2019-05-03

## 2019-05-03 NOTE — TELEPHONE ENCOUNTER
Please let patient know that her CXR was normal and find out how she is doing. Candice German PA-C  [facility]  [phone]  [address]

## 2019-05-07 NOTE — TELEPHONE ENCOUNTER
Left message for patient stating providers remarks and for her to call back with an updated stating her current status

## 2019-05-19 ENCOUNTER — TELEPHONE (OUTPATIENT)
Dept: FAMILY MEDICINE CLINIC | Age: 44
End: 2019-05-19

## 2019-05-20 NOTE — TELEPHONE ENCOUNTER
Please let patient know that her ECHO was normal and showed no enlargement of her heart. Follow-up in clinic as needed. Candice German PA-C  [facility]  [phone]  [address]

## 2019-05-20 NOTE — TELEPHONE ENCOUNTER
Patient informed of the ECHO results as detailed in providers note. Patient verbalized understanding of information given.

## 2019-06-14 DIAGNOSIS — I10 ESSENTIAL HYPERTENSION: ICD-10-CM

## 2019-06-14 NOTE — TELEPHONE ENCOUNTER
Requested Prescriptions     Pending Prescriptions Disp Refills    lisinopril-hydroCHLOROthiazide (PRINZIDE, ZESTORETIC) 20-25 mg per tablet 90 Tab 0     Sig: Take 1 Tab by mouth daily.

## 2019-06-17 RX ORDER — LISINOPRIL AND HYDROCHLOROTHIAZIDE 20; 25 MG/1; MG/1
1 TABLET ORAL DAILY
Qty: 90 TAB | Refills: 0 | Status: SHIPPED | OUTPATIENT
Start: 2019-06-17 | End: 2019-07-03 | Stop reason: SDUPTHER

## 2019-07-03 ENCOUNTER — OFFICE VISIT (OUTPATIENT)
Dept: FAMILY MEDICINE CLINIC | Age: 44
End: 2019-07-03

## 2019-07-03 VITALS
HEART RATE: 85 BPM | BODY MASS INDEX: 41.29 KG/M2 | SYSTOLIC BLOOD PRESSURE: 126 MMHG | WEIGHT: 233 LBS | HEIGHT: 63 IN | OXYGEN SATURATION: 95 % | TEMPERATURE: 98.7 F | DIASTOLIC BLOOD PRESSURE: 71 MMHG | RESPIRATION RATE: 16 BRPM

## 2019-07-03 DIAGNOSIS — D64.9 ANEMIA, UNSPECIFIED TYPE: Primary | ICD-10-CM

## 2019-07-03 DIAGNOSIS — G89.29 CHRONIC PAIN OF BOTH SHOULDERS: ICD-10-CM

## 2019-07-03 DIAGNOSIS — M25.511 CHRONIC PAIN OF BOTH SHOULDERS: ICD-10-CM

## 2019-07-03 DIAGNOSIS — M54.6 THORACIC BACK PAIN, UNSPECIFIED BACK PAIN LATERALITY, UNSPECIFIED CHRONICITY: ICD-10-CM

## 2019-07-03 DIAGNOSIS — I10 ESSENTIAL HYPERTENSION: ICD-10-CM

## 2019-07-03 DIAGNOSIS — M25.512 CHRONIC PAIN OF BOTH SHOULDERS: ICD-10-CM

## 2019-07-03 RX ORDER — LISINOPRIL AND HYDROCHLOROTHIAZIDE 20; 25 MG/1; MG/1
1 TABLET ORAL DAILY
Qty: 90 TAB | Refills: 0 | Status: SHIPPED | OUTPATIENT
Start: 2019-07-03 | End: 2021-01-20

## 2019-07-03 RX ORDER — CYANOCOBALAMIN (VITAMIN B-12) 1000 MCG
1 TABLET, EXTENDED RELEASE ORAL DAILY
Qty: 90 TAB | Refills: 0 | Status: SHIPPED | OUTPATIENT
Start: 2019-07-03

## 2019-07-03 NOTE — PROGRESS NOTES
Follow Up Visit Note    Chief Complaint   Patient presents with    Medication Refill     Prinzide       HPI:  Dante Katz is a 45 y.o.  female  has a past medical history of Anemia, Gallbladder disease, GERD (gastroesophageal reflux disease), Hidradenitis, and Hypertension. is here for the above complaint(s). Bilateral shoulder pain/Back Pain  Patient presents with ongoing bilateral shoulder pain 2 to her bra straps cutting into her skin. Patient states that she has accompanying mid thoracic back pain. She reports that her bra size is a 40H. Patient feels that her breasts pull her forward and cause upper/mid thoracic back pain. Patient reports a 4/10 shoulder and mid back pain that doesn't radiate anywhere. Patient describes the shoulder pain as dull/aching and the back pain as aching. She denies any numbness/tingling in her arms or legs and denies weakness in extremities. Patient has tried tylenol/ibuprofen for pain with minimal relief. Patient has tried different bras that have been a financial burden and she has been unable to find any relief. Hypertension  Patient is currently taking   Key CAD CHF Meds             lisinopril-hydroCHLOROthiazide (PRINZIDE, ZESTORETIC) 20-25 mg per tablet (Taking) Take 1 Tab by mouth daily. . BP today is   BP Readings from Last 1 Encounters:   07/03/19 126/71     Patient has been taking medications as prescribed. Patient is not checking BP at home. Patient does follow low salt diet. Patient is not currently exercising because of large breasts. Patient denies chest pain, shortness of breath, palpitations, lower extremity edema, dizziness/lightheadedness or syncopal episodes. Obesity  Patient has gained 3 pounds since last visit. She states that she only eats once a day and this is typically not healthy foods. She is not currently exercising.    Wt Readings from Last 3 Encounters:   07/03/19 233 lb (105.7 kg)   05/05/19 230 lb (104.3 kg) 04/29/19 232 lb 12.8 oz (105.6 kg)       Anemia  Patient has history of anemia. Her last blood work shows iron deficiency. Hemoglobin was WNL, but iron levels were low. Patient is not currently taking iron supplements because she forgets to take it. She does not have a current prescription for this at this time. Patient does endorse fatigue, likely 2 to iron deficiency as she feels better when she takes her iron. Denies headache, lightheadedness, dizziness, vision changes, chest pain at rest or with exertion, rapid/irregular heart rate, shortness of breath at rest or with exertion, cough, abdominal pain, leg swelling, leg pain. Review of Systems   Constitutional: Positive for malaise/fatigue. Negative for chills, fever and weight loss. Eyes: Negative for blurred vision, double vision and pain. Respiratory: Negative for cough, sputum production, shortness of breath and wheezing. Cardiovascular: Negative for chest pain, palpitations, orthopnea, claudication and leg swelling. Gastrointestinal: Negative for abdominal pain, constipation, diarrhea, nausea and vomiting. Genitourinary: Negative for dysuria, frequency and urgency. Musculoskeletal: Positive for back pain and joint pain. Negative for falls, myalgias and neck pain. Neurological: Negative for dizziness, tingling and headaches. Current Outpatient Medications   Medication Sig    lisinopril-hydroCHLOROthiazide (PRINZIDE, ZESTORETIC) 20-25 mg per tablet Take 1 Tab by mouth daily.  naproxen sodium (ANAPROX DS) 550 mg tablet Take 1 Tab by mouth two (2) times daily (with meals).  methocarbamol (ROBAXIN) 500 mg tablet Take 2 Tabs by mouth four (4) times daily.  benzonatate (TESSALON PERLES) 100 mg capsule Take 100 mg by mouth three (3) times daily as needed for Cough.  montelukast (SINGULAIR) 10 mg tablet Take 1 Tab by mouth daily.     albuterol (PROVENTIL HFA, VENTOLIN HFA, PROAIR HFA) 90 mcg/actuation inhaler Take 2 Puffs by inhalation every four (4) hours as needed for Wheezing.  fluticasone propionate (FLONASE) 50 mcg/actuation nasal spray 2 Sprays by Both Nostrils route daily. No current facility-administered medications for this visit. Health Maintenance   Topic Date Due    DTaP/Tdap/Td series (1 - Tdap) 06/13/1998    PAP AKA CERVICAL CYTOLOGY  06/13/1998    Influenza Age 5 to Adult  08/01/2019    Pneumococcal 0-64 years  Aged Out       There is no immunization history on file for this patient. Allergies and Medications: Reviewed and updated in EMR. Past Medical History:   Diagnosis Date    Anemia     Gallbladder disease     GERD (gastroesophageal reflux disease)     Hidradenitis     Hypertension        Surgical History: Reviewed and updated in EMR as appropriate. Social History: Reviewed and updated in EMR as appropriate. Family History: Reviewed and updated in EMR as appropriate. OBJECTIVE:   Visit Vitals  /71   Pulse 85   Temp 98.7 °F (37.1 °C) (Oral)   Resp 16   Ht 5' 3\" (1.6 m)   Wt 233 lb (105.7 kg)   LMP 06/25/2019 (Approximate)   SpO2 95%   BMI 41.27 kg/m²        Physical Exam   Constitutional: She is oriented to person, place, and time and well-developed, well-nourished, and in no distress. No distress. Neck: Normal range of motion. Neck supple. No thyromegaly present. Cardiovascular: Normal rate, regular rhythm, normal heart sounds and intact distal pulses. Exam reveals no gallop and no friction rub. No murmur heard. Pulmonary/Chest: Effort normal and breath sounds normal. No respiratory distress. She has no wheezes. She has no rales. She exhibits no tenderness. Musculoskeletal: She exhibits no edema or deformity. Right shoulder: She exhibits tenderness. She exhibits normal range of motion, no bony tenderness, no swelling, no effusion, no crepitus, no deformity, no laceration, no pain, no spasm, normal pulse and normal strength.         Left shoulder: She exhibits tenderness. She exhibits no bony tenderness, no swelling, no effusion, no crepitus, no laceration, no pain, no spasm, normal pulse and normal strength. Thoracic back: She exhibits decreased range of motion, laceration and pain. She exhibits no tenderness, no bony tenderness, no swelling, no edema, no deformity, no spasm and normal pulse. Arms:  Lymphadenopathy:     She has no cervical adenopathy. Neurological: She is alert and oriented to person, place, and time. Skin: Skin is warm and dry. No purpura noted. Rash is not macular, not papular, not maculopapular, not nodular, not pustular, not vesicular and not urticarial. She is not diaphoretic.        +indentions in patient's skin from bra line. Psychiatric: Mood, memory, affect and judgment normal.   Vitals reviewed. LABS/RADIOLOGICAL TESTS:  Lab Results   Component Value Date/Time    WBC 9.4 04/29/2019 12:01 PM    HGB 11.7 04/29/2019 12:01 PM    HCT 37.3 04/29/2019 12:01 PM    PLATELET 417 43/23/5061 12:01 PM     Lab Results   Component Value Date/Time    Sodium 145 03/11/2019 08:20 AM    Potassium 4.1 03/11/2019 08:20 AM    Chloride 103 03/11/2019 08:20 AM    CO2 25 03/11/2019 08:20 AM    Glucose 104 (H) 03/11/2019 08:20 AM    BUN 12 03/11/2019 08:20 AM    Creatinine 0.9 03/11/2019 08:20 AM     Lab Results   Component Value Date/Time    Cholesterol, total 156 03/11/2019 08:20 AM    HDL Cholesterol 41 03/11/2019 08:20 AM    LDL, calculated 96 03/11/2019 08:20 AM    Triglyceride 98 03/11/2019 08:20 AM     No results found for: GPT  No results found for: HBA1C, HGBE8, DUA8DTSU, EDN1JWKS      All lab results and radiological studies were reviewed and discussed with the patient. ASSESSMENT/PLAN:    Diagnoses and all orders for this visit:    Diagnoses and all orders for this visit:    1. Anemia, unspecified type  -     iron, carbonyl (FEOSOL) 45 mg tab; Take 1 Tab by mouth daily. Repeat labs in 3 months    2.  Essential hypertension  - lisinopril-hydroCHLOROthiazide (PRINZIDE, ZESTORETIC) 20-25 mg per tablet; Take 1 Tab by mouth daily. 1) Goal blood pressure less than equal to 140/90 mmHg, goal BP can vary depending on risk factors as discussed. 2) Lifestyle modifications discussed with patient, low sodium <2 gm, low salt , DASH diet  3) Exercise for at least 30 min 3-5 times a week for goal BMI of less than or equal  To 25. Eduin Ingram 4) Goal LDL<100.  5) Please monitor your blood pressure and keep a log to bring in with you at each visit. 6) Discussed risk factors with patient such as CAD, FAST of stroke symptoms, pt verbalizes  nderstanding. 7) Please avoid smoking , alcohol and any illicit drug use if applicable to you. 8) Discussed lifestyle modifications ,dietary control and BP monitoring at home as patient does not wish to begin an antihypertension agent at present. 3. Chronic pain of both shoulders  Continue with NSAIDs as needed. Take breaks from wearing bras, and consider wearing sports bras more often. 4. Thoracic back pain, unspecified back pain laterality, unspecified chronicity  Exercises provided. Continue with NSAIDs as needed. 5. Obesity  The patient is asked to make an attempt to improve diet and exercise patterns to aid in medical management of this problem. Weight loss recommended for overall health. Patient verbalized understanding and agreement with the plan. Patient was given an after-visit summary. Follow-up in 1 month or sooner if worsening symptoms. More than 50% of this 25 min visit was spent counseling the patient face to face about etiology and treatment of health conditions outlined in assessment and plan        Candice German PA-C  [address], [address] [place] [address], [address] [place] [address] [phone]  [place] [phone]

## 2019-07-03 NOTE — PROGRESS NOTES
Chief Complaint   Patient presents with    Medication Refill     Lisinopril     1. Have you been to the ER, urgent care clinic since your last visit? Hospitalized since your last visit? No    2. Have you seen or consulted any other health care providers outside of the [address] [place] [address] since your last visit? Include any pap smears or colon screening.  No

## 2019-07-03 NOTE — PATIENT INSTRUCTIONS
DASH Diet: Care Instructions  Your Care Instructions    The DASH diet is an eating plan that can help lower your blood pressure. DASH stands for Dietary Approaches to Stop Hypertension. Hypertension is high blood pressure. The DASH diet focuses on eating foods that are high in calcium, potassium, and magnesium. These nutrients can lower blood pressure. The foods that are highest in these nutrients are fruits, vegetables, low-fat dairy products, nuts, seeds, and legumes. But taking calcium, potassium, and magnesium supplements instead of eating foods that are high in those nutrients does not have the same effect. The DASH diet also includes whole grains, fish, and poultry. The DASH diet is one of several lifestyle changes your doctor may recommend to lower your high blood pressure. Your doctor may also want you to decrease the amount of sodium in your diet. Lowering sodium while following the DASH diet can lower blood pressure even further than just the DASH diet alone. Follow-up care is a key part of your treatment and safety. Be sure to make and go to all appointments, and call your doctor if you are having problems. It's also a good idea to know your test results and keep a list of the medicines you take. How can you care for yourself at home? Following the DASH diet  · Eat 4 to 5 servings of fruit each day. A serving is 1 medium-sized piece of fruit, ½ cup chopped or canned fruit, 1/4 cup dried fruit, or 4 ounces (½ cup) of fruit juice. Choose fruit more often than fruit juice. · Eat 4 to 5 servings of vegetables each day. A serving is 1 cup of lettuce or raw leafy vegetables, ½ cup of chopped or cooked vegetables, or 4 ounces (½ cup) of vegetable juice. Choose vegetables more often than vegetable juice. · Get 2 to 3 servings of low-fat and fat-free dairy each day. A serving is 8 ounces of milk, 1 cup of yogurt, or 1 ½ ounces of cheese. · Eat 6 to 8 servings of grains each day.  A serving is 1 slice of bread, 1 ounce of dry cereal, or ½ cup of cooked rice, pasta, or cooked cereal. Try to choose whole-grain products as much as possible. · Limit lean meat, poultry, and fish to 2 servings each day. A serving is 3 ounces, about the size of a deck of cards. · Eat 4 to 5 servings of nuts, seeds, and legumes (cooked dried beans, lentils, and split peas) each week. A serving is 1/3 cup of nuts, 2 tablespoons of seeds, or ½ cup of cooked beans or peas. · Limit fats and oils to 2 to 3 servings each day. A serving is 1 teaspoon of vegetable oil or 2 tablespoons of salad dressing. · Limit sweets and added sugars to 5 servings or less a week. A serving is 1 tablespoon jelly or jam, ½ cup sorbet, or 1 cup of lemonade. · Eat less than 2,300 milligrams (mg) of sodium a day. If you limit your sodium to 1,500 mg a day, you can lower your blood pressure even more. Tips for success  · Start small. Do not try to make dramatic changes to your diet all at once. You might feel that you are missing out on your favorite foods and then be more likely to not follow the plan. Make small changes, and stick with them. Once those changes become habit, add a few more changes. · Try some of the following:  ? Make it a goal to eat a fruit or vegetable at every meal and at snacks. This will make it easy to get the recommended amount of fruits and vegetables each day. ? Try yogurt topped with fruit and nuts for a snack or healthy dessert. ? Add lettuce, tomato, cucumber, and onion to sandwiches. ? Combine a ready-made pizza crust with low-fat mozzarella cheese and lots of vegetable toppings. Try using tomatoes, squash, spinach, broccoli, carrots, cauliflower, and onions. ? Have a variety of cut-up vegetables with a low-fat dip as an appetizer instead of chips and dip. ? Sprinkle sunflower seeds or chopped almonds over salads. Or try adding chopped walnuts or almonds to cooked vegetables.   ? Try some vegetarian meals using beans and peas. Add garbanzo or kidney beans to salads. Make burritos and tacos with mashed mcarthur beans or black beans. Where can you learn more? Go to http://barbara-trent.info/. Enter Y400 in the search box to learn more about \"DASH Diet: Care Instructions. \"  Current as of: July 22, 2018  Content Version: 11.9  © 1522-6423 [email or web address]. Care instructions adapted under license by [employer] (which disclaims liability or warranty for this information). If you have questions about a medical condition or this instruction, always ask your healthcare professional. [address] any warranty or liability for your use of this information.

## 2019-07-05 ENCOUNTER — TELEPHONE (OUTPATIENT)
Dept: FAMILY MEDICINE CLINIC | Age: 44
End: 2019-07-05

## 2019-07-05 NOTE — TELEPHONE ENCOUNTER
Jessica Sánchez with [facility] states received rx for iron 45 mg tabs. States only has 65 mg and inquiring if ok to release to pt.   Please call

## 2019-07-05 NOTE — TELEPHONE ENCOUNTER
Pharmacy replacement okay to give patient. Please contact pharmacy to let them know. Candice German PA-C  [facility]  [phone]  [address]

## 2019-09-18 DIAGNOSIS — J02.9 PHARYNGITIS, UNSPECIFIED ETIOLOGY: ICD-10-CM

## 2019-09-18 DIAGNOSIS — J30.9 ALLERGIC RHINITIS, UNSPECIFIED SEASONALITY, UNSPECIFIED TRIGGER: ICD-10-CM

## 2019-09-19 RX ORDER — FLUTICASONE PROPIONATE 50 MCG
2 SPRAY, SUSPENSION (ML) NASAL DAILY
Qty: 1 BOTTLE | Refills: 0 | Status: SHIPPED | OUTPATIENT
Start: 2019-09-19 | End: 2021-01-20

## 2019-10-10 ENCOUNTER — OFFICE VISIT (OUTPATIENT)
Dept: FAMILY MEDICINE CLINIC | Age: 44
End: 2019-10-10

## 2019-10-10 VITALS
WEIGHT: 232 LBS | RESPIRATION RATE: 18 BRPM | DIASTOLIC BLOOD PRESSURE: 75 MMHG | OXYGEN SATURATION: 99 % | HEIGHT: 63 IN | BODY MASS INDEX: 41.11 KG/M2 | TEMPERATURE: 97.9 F | SYSTOLIC BLOOD PRESSURE: 129 MMHG | HEART RATE: 71 BPM

## 2019-10-10 DIAGNOSIS — H10.9 BACTERIAL CONJUNCTIVITIS OF RIGHT EYE: Primary | ICD-10-CM

## 2019-10-10 DIAGNOSIS — H57.89 REDNESS OF EYE, LEFT: ICD-10-CM

## 2019-10-10 RX ORDER — CIPROFLOXACIN HYDROCHLORIDE 3.5 MG/ML
SOLUTION/ DROPS TOPICAL
Qty: 5 ML | Refills: 0 | Status: SHIPPED | OUTPATIENT
Start: 2019-10-10 | End: 2021-01-20

## 2019-10-10 NOTE — PROGRESS NOTES
Chief Complaint   Patient presents with    Eye Problem     left eye is red and irritated     1. Have you been to the ER, urgent care clinic since your last visit? Hospitalized since your last visit? No    2. Have you seen or consulted any other health care providers outside of the [address] [place] [address] since your last visit? Include any pap smears or colon screening.  No

## 2019-10-10 NOTE — PROGRESS NOTES
Patient: Mary Ann Gilliam  Date of Service: 10/10/2019   Provider: Geo Batista PA-C     REASON FOR VISIT:   Chief Complaint   Patient presents with    Eye Problem     left eye is red and irritated        HISTORY OF PRESENT ILLNESS:   Mary Ann Gilliam is a 45 y.o. female who presents to the office for acute care. Patient states that she woke up this morning and her left eye was red. Patient also notes that she does have a \"foreign body sensation in her left eye\". Patient states that she also had some crusting on the inner corner of her left eye. Patient works in a  and does state that there has been several children that have come into the  with conjunctivitis. Patient denies any eye pain, vision changes, blurred vision, double vision or headaches. Patient denies any trauma to the left eye, she denies any foreign body getting into her eye, or scratching of her eye. ALLERGIES:   No Known Allergies     ACTIVE MEDICAL PROBLEMS:  Patient Active Problem List   Diagnosis Code    Obesity, morbid ([facility]) E66.01    Hypertension I10    GERD (gastroesophageal reflux disease) K21.9    Anemia D64.9    Gigantomastia N62    Allergic rhinitis J30.9        MEDICATIONS:   Current Outpatient Medications   Medication Sig Dispense Refill    ciprofloxacin HCl (CILOXAN) 0.3 % ophthalmic solution Instill 1 to 2 drops into both eyes every 2 hours while awake for 2 days and 1 to 2 drops every 4 hours while awake for the next 5 days 5 mL 0    fluticasone propionate (FLONASE) 50 mcg/actuation nasal spray 2 Sprays by Both Nostrils route daily. 1 Bottle 0    iron, carbonyl (FEOSOL) 45 mg tab Take 1 Tab by mouth daily. 90 Tab 0    lisinopril-hydroCHLOROthiazide (PRINZIDE, ZESTORETIC) 20-25 mg per tablet Take 1 Tab by mouth daily. 90 Tab 0    naproxen sodium (ANAPROX DS) 550 mg tablet Take 1 Tab by mouth two (2) times daily (with meals).  20 Tab 0    methocarbamol (ROBAXIN) 500 mg tablet Take 2 Tabs by mouth four (4) times daily. 16 Tab 0    benzonatate (TESSALON PERLES) 100 mg capsule Take 100 mg by mouth three (3) times daily as needed for Cough.  montelukast (SINGULAIR) 10 mg tablet Take 1 Tab by mouth daily. 90 Tab 0    albuterol (PROVENTIL HFA, VENTOLIN HFA, PROAIR HFA) 90 mcg/actuation inhaler Take 2 Puffs by inhalation every four (4) hours as needed for Wheezing. 1 Inhaler 0        Review of Systems   Eyes: Positive for redness. Negative for blurred vision, double vision, photophobia, pain and discharge. PHYSICAL EXAMINATION:  Visit Vitals  /75   Pulse 71   Temp 97.9 °F (36.6 °C) (Oral)   Resp 18   Ht 5' 3\" (1.6 m)   Wt 232 lb (105.2 kg)   LMP 10/07/2019 (Exact Date)   SpO2 99%   BMI 41.10 kg/m²      Body mass index is 41.1 kg/m². Physical Exam   Constitutional: She is oriented to person, place, and time and well-developed, well-nourished, and in no distress. No distress. Eyes: Pupils are equal, round, and reactive to light. Lids are normal. Lids are everted and swept, no foreign bodies found. Right conjunctiva is injected. Right eye exhibits abnormal extraocular motion. Neck: Normal range of motion. Neck supple. No thyromegaly present. Cardiovascular: Normal rate, regular rhythm, normal heart sounds and intact distal pulses. Exam reveals no gallop and no friction rub. No murmur heard. Pulmonary/Chest: Effort normal and breath sounds normal. No respiratory distress. She has no wheezes. She has no rales. She exhibits no tenderness. Lymphadenopathy:     She has no cervical adenopathy. Neurological: She is alert and oriented to person, place, and time. Skin: Skin is warm and dry. She is not diaphoretic. Psychiatric: Mood, memory, affect and judgment normal.   Vitals reviewed. ASSESSMENT/PLAN:  Diagnoses and all orders for this visit:    1. Bacterial conjunctivitis of right eye  -     ciprofloxacin HCl (CILOXAN) 0.3 % ophthalmic solution;  Instill 1 to 2 drops into both eyes every 2 hours while awake for 2 days and 1 to 2 drops every 4 hours while awake for the next 5 days  RTN in 3-5 days if no improvement or with worsening of symptoms. Patient advised to return to clinic if symptoms persist or to ED if they become worse  Patient verbalized understanding to above instructions.   More than 50% of this 15 min visit was spent counseling the patient face to face about etiology and treatment of health conditions outlined in assessment and plan           Deanna Cortez PA-C   10/10/2019   11:52 AM

## 2021-02-15 PROBLEM — K76.0 FATTY LIVER: Status: ACTIVE | Noted: 2021-02-15

## 2021-02-15 PROBLEM — K76.89 HEPATIC CYST: Status: ACTIVE | Noted: 2021-02-15

## 2021-02-15 PROBLEM — R73.03 PREDIABETES: Status: ACTIVE | Noted: 2021-02-15

## 2021-02-15 PROBLEM — G47.33 OSA (OBSTRUCTIVE SLEEP APNEA): Status: ACTIVE | Noted: 2021-02-15

## 2021-02-15 PROBLEM — E66.01 SEVERE OBESITY (HCC): Status: ACTIVE | Noted: 2021-02-15

## 2021-08-03 PROBLEM — E66.01 OBESITY, MORBID (HCC): Status: RESOLVED | Noted: 2019-03-04 | Resolved: 2021-08-03

## 2022-03-18 PROBLEM — G47.33 OSA (OBSTRUCTIVE SLEEP APNEA): Status: ACTIVE | Noted: 2021-02-15

## 2022-03-18 PROBLEM — N62 GIGANTOMASTIA: Status: ACTIVE | Noted: 2019-03-29

## 2022-03-19 PROBLEM — R73.03 PREDIABETES: Status: ACTIVE | Noted: 2021-02-15

## 2022-03-19 PROBLEM — J30.9 ALLERGIC RHINITIS: Status: ACTIVE | Noted: 2019-04-29

## 2022-03-19 PROBLEM — K76.0 FATTY LIVER: Status: ACTIVE | Noted: 2021-02-15

## 2022-03-20 PROBLEM — K76.89 HEPATIC CYST: Status: ACTIVE | Noted: 2021-02-15

## 2022-03-20 PROBLEM — E66.01 SEVERE OBESITY (HCC): Status: ACTIVE | Noted: 2021-02-15

## 2023-05-25 RX ORDER — ACETAMINOPHEN 160 MG/5ML
650 SUSPENSION ORAL EVERY 6 HOURS PRN
COMMUNITY
Start: 2021-02-15

## 2023-05-25 RX ORDER — IBUPROFEN 200 MG
CAPSULE ORAL DAILY
COMMUNITY

## 2023-05-25 RX ORDER — LISINOPRIL 20 MG/1
TABLET ORAL DAILY
COMMUNITY